# Patient Record
Sex: FEMALE | Race: WHITE
[De-identification: names, ages, dates, MRNs, and addresses within clinical notes are randomized per-mention and may not be internally consistent; named-entity substitution may affect disease eponyms.]

---

## 2017-04-18 PROBLEM — Z00.00 ENCOUNTER FOR PREVENTIVE HEALTH EXAMINATION: Status: ACTIVE | Noted: 2017-04-18

## 2017-05-22 ENCOUNTER — APPOINTMENT (OUTPATIENT)
Dept: HEART AND VASCULAR | Facility: CLINIC | Age: 27
End: 2017-05-22

## 2019-11-20 ENCOUNTER — APPOINTMENT (OUTPATIENT)
Dept: CARDIOLOGY | Facility: CLINIC | Age: 29
End: 2019-11-20

## 2020-02-29 ENCOUNTER — EMERGENCY (EMERGENCY)
Facility: HOSPITAL | Age: 30
LOS: 0 days | Discharge: HOME | End: 2020-02-29
Attending: EMERGENCY MEDICINE | Admitting: EMERGENCY MEDICINE
Payer: COMMERCIAL

## 2020-02-29 VITALS — OXYGEN SATURATION: 98 % | HEART RATE: 82 BPM | RESPIRATION RATE: 20 BRPM

## 2020-02-29 VITALS
RESPIRATION RATE: 20 BRPM | DIASTOLIC BLOOD PRESSURE: 84 MMHG | WEIGHT: 119.93 LBS | OXYGEN SATURATION: 96 % | TEMPERATURE: 99 F | HEART RATE: 102 BPM | SYSTOLIC BLOOD PRESSURE: 140 MMHG

## 2020-02-29 DIAGNOSIS — J45.909 UNSPECIFIED ASTHMA, UNCOMPLICATED: ICD-10-CM

## 2020-02-29 DIAGNOSIS — J45.901 UNSPECIFIED ASTHMA WITH (ACUTE) EXACERBATION: ICD-10-CM

## 2020-02-29 DIAGNOSIS — R07.89 OTHER CHEST PAIN: ICD-10-CM

## 2020-02-29 PROCEDURE — 99285 EMERGENCY DEPT VISIT HI MDM: CPT

## 2020-02-29 PROCEDURE — 71046 X-RAY EXAM CHEST 2 VIEWS: CPT | Mod: 26

## 2020-02-29 RX ORDER — DIPHENHYDRAMINE HCL 50 MG
25 CAPSULE ORAL ONCE
Refills: 0 | Status: COMPLETED | OUTPATIENT
Start: 2020-02-29 | End: 2020-02-29

## 2020-02-29 RX ORDER — DEXAMETHASONE 0.5 MG/5ML
10 ELIXIR ORAL ONCE
Refills: 0 | Status: COMPLETED | OUTPATIENT
Start: 2020-02-29 | End: 2020-02-29

## 2020-02-29 RX ORDER — IPRATROPIUM/ALBUTEROL SULFATE 18-103MCG
3 AEROSOL WITH ADAPTER (GRAM) INHALATION ONCE
Refills: 0 | Status: COMPLETED | OUTPATIENT
Start: 2020-02-29 | End: 2020-02-29

## 2020-02-29 RX ORDER — DEXAMETHASONE 0.5 MG/5ML
10 ELIXIR ORAL ONCE
Refills: 0 | Status: DISCONTINUED | OUTPATIENT
Start: 2020-02-29 | End: 2020-02-29

## 2020-02-29 RX ADMIN — Medication 3 MILLILITER(S): at 04:27

## 2020-02-29 RX ADMIN — Medication 25 MILLIGRAM(S): at 05:30

## 2020-02-29 RX ADMIN — Medication 3 MILLILITER(S): at 04:03

## 2020-02-29 RX ADMIN — Medication 100 MILLIGRAM(S): at 04:27

## 2020-02-29 RX ADMIN — Medication 10 MILLIGRAM(S): at 05:26

## 2020-02-29 NOTE — ED ADULT TRIAGE NOTE - CHIEF COMPLAINT QUOTE
pt with asthma exacerbation at home  used rescue inhaler, albuterol nebs & started prednisone at home with no relief

## 2020-02-29 NOTE — ED PROVIDER NOTE - OBJECTIVE STATEMENT
29y F pmh asthma presenting with cough and SOB x5 days. Nonproductive cough with SOB not improving with nebulizing treatments. Has been taking prednisone 50mg x4days. No f/c/n/v. Has never been intubated. Never has had ICU admission. This is how asthma exacerbation usually presents. No f/c/n/v. No leg swelling. No recent travel. No sick contacts. 29y F pmh asthma presenting with cough x5 days. Nonproductive cough not improving with nebulizing treatments. Has been taking prednisone 50mg x4days. No f/c/n/v. Patient denying SOB and chest pain but states she gets these coughing fits biannually typical of her asthma exacerbations. Has never been intubated. Never has had ICU admission. No f/c/n/v. No leg swelling. No recent travel. No sick contacts.

## 2020-02-29 NOTE — ED ADULT NURSE NOTE - OBJECTIVE STATEMENT
Pt reports asthma exacerbation. SOB and dry hacking cough not responding to home rescue inhalers, or prednisone. Increasing cough throughout the night. States that normally, when the weather goes from warm to cold is when her asthma is triggered. On prednisone at home for similar symptoms, but no relief for today.

## 2020-02-29 NOTE — ED PROVIDER NOTE - NSFOLLOWUPINSTRUCTIONS_ED_ALL_ED_FT
PLEASE FOLLOW UP WITH YOUR PULMONOLOGIST IN 1-3 DAYS.     Asthma    Asthma is a condition in which the airways tighten and narrow, making it difficult to breath. Asthma episodes, also called asthma attacks, range from minor to life-threatening. Symptoms include wheezing, coughing, chest tightness, or shortness of breath. The diagnosis of asthma is made by a review of your medical history and a physical exam, but may involve additional testing. Asthma cannot be cured, but medicines and lifestyle changes can help control it. Avoid triggers of asthma which may include animal dander, pollen, mold, smoke, air pollutants, etc.   Please take albuterol via pump or inhalation device every 4-6 hours for the next two days and then follow up with your pediatrician.  please remember to take your controller meds (pulmicort/flovent/advair) if you were prescribed them  SEEK IMMEDIATE MEDICAL CARE IF YOU HAVE ANY OF THE FOLLOWING SYMPTOMS: worsening of symptoms, shortness of breath at rest, chest pain, bluish discoloration to lips or fingertips, lightheadedness/dizziness, or fever.    Cough    Coughing is a reflex that clears your throat and your airways. Coughing helps to heal and protect your lungs. It is normal to cough occasionally, but a cough that happens with other symptoms or lasts a long time may be a sign of a condition that needs treatment. Coughing may be caused by infections, asthma or COPD, smoking, postnasal drip, gastroesophageal reflux, as well as other medical conditions. Take medicines only as instructed by your health care provider. Avoid environments or triggers that causes you to cough at work or at home.    SEEK IMMEDIATE MEDICAL CARE IF YOU HAVE ANY OF THE FOLLOWING SYMPTOMS: coughing up blood, shortness of breath, rapid heart rate, chest pain, unexplained weight loss or night sweats.

## 2020-02-29 NOTE — ED PROVIDER NOTE - PATIENT PORTAL LINK FT
You can access the FollowMyHealth Patient Portal offered by [facility] by registering at the following website: [email or web address]. By joining [employer]’s FollowMyHealth portal, you will also be able to view your health information using other applications (apps) compatible with our system.

## 2020-02-29 NOTE — ED ADULT NURSE NOTE - NSIMPLEMENTINTERV_GEN_ALL_ED
Implemented All Universal Safety Interventions:  [address] to call system. Call bell, personal items and telephone within reach. Instruct patient to call for assistance. Room bathroom lighting operational. Non-slip footwear when patient is off stretcher. Physically safe environment: no spills, clutter or unnecessary equipment. Stretcher in lowest position, wheels locked, appropriate side rails in place.

## 2020-02-29 NOTE — ED PROVIDER NOTE - NS ED ROS FT
Eyes:  No visual changes, eye pain or discharge.  ENMT:  No hearing changes, pain, no sore throat or runny nose, no difficulty swallowing  Cardiac:  No chest pain,  edema. No chest pain with exertion.  Respiratory:  +cough +hx of asthma +SOB  GI:  No nausea, vomiting, diarrhea or abdominal pain.  :  No dysuria, frequency or burning.  MS:  No myalgia, muscle weakness, joint pain or back pain.  Neuro:  No headache or weakness.  No LOC.  Skin:  No skin rash.   Endocrine: No history of thyroid disease or diabetes. Eyes:  No visual changes, eye pain or discharge.  ENMT:  No hearing changes, pain, no sore throat or runny nose, no difficulty swallowing  Cardiac:  No chest pain,  edema. No chest pain with exertion.  Respiratory:  +cough +hx of asthma   GI:  No nausea, vomiting, diarrhea or abdominal pain.  :  No dysuria, frequency or burning.  MS:  No myalgia, muscle weakness, joint pain or back pain.  Neuro:  No headache or weakness.  No LOC.  Skin:  No skin rash.   Endocrine: No history of thyroid disease or diabetes.

## 2020-02-29 NOTE — ED PROVIDER NOTE - PHYSICAL EXAMINATION
CONSTITUTIONAL: Well-developed; well-nourished; in no acute distress.   SKIN: warm, dry  HEAD: Normocephalic; atraumatic.  EYES: no conjunctival injection. PERRL.   ENT: No nasal discharge; airway clear.  NECK: Supple; non tender.  CARD: S1, S2 normal; no murmurs, gallops, or rubs. Regular rate and rhythm.   RESP: No wheezes, rales or rhonchi.  ABD: soft ntnd  EXT: Normal ROM.  No clubbing, cyanosis or edema.   LYMPH: No acute cervical adenopathy.  NEURO: Alert, oriented, grossly unremarkable  PSYCH: Cooperative, appropriate. CONSTITUTIONAL: Well-developed; well-nourished; in no acute distress.   SKIN: warm, dry  HEAD: Normocephalic; atraumatic.  EYES: no conjunctival injection. PERRL.   ENT: No nasal discharge; airway clear. Oropharynx is clear.   NECK: Supple; non tender.  CARD: S1, S2 normal; no murmurs, gallops, or rubs. Regular rate and rhythm.   RESP: No wheezes, rales or rhonchi. normal work of breathing.   ABD: soft ntnd  EXT: Normal ROM.  No clubbing, cyanosis or edema.   NEURO: Alert, oriented, grossly unremarkable  PSYCH: Cooperative, appropriate.

## 2020-02-29 NOTE — ED ADULT NURSE NOTE - NS_SISCREENINGSR_GEN_ALL_ED
Start Trazodone for sleep: Take 30 minutes to 1 hour before bedtime.     Take one tablet (50 mg) nightly for 14 nights, then increase to two tablets (100 mg) nightly.   Negative

## 2020-02-29 NOTE — ED PROVIDER NOTE - ATTENDING CONTRIBUTION TO CARE
hx of astham for past 4 days worsening chest tightness coughing that is simlar to her asthma without hemoptysis, cough is episodic and spasmodic and can be triggered by minor incidents, no chest pain no pleuritic symptoms. exam shows no wheezing but spasmodic coughing. patient already on prednisone 50 mg. will give dose of decadron here. treat with nebs and obtain cxr.

## 2020-10-12 PROBLEM — J45.909 UNSPECIFIED ASTHMA, UNCOMPLICATED: Chronic | Status: ACTIVE | Noted: 2020-02-29

## 2021-04-14 ENCOUNTER — APPOINTMENT (OUTPATIENT)
Dept: BREAST CENTER | Facility: CLINIC | Age: 31
End: 2021-04-14
Payer: COMMERCIAL

## 2021-04-14 VITALS
DIASTOLIC BLOOD PRESSURE: 80 MMHG | BODY MASS INDEX: 22.45 KG/M2 | HEIGHT: 62 IN | SYSTOLIC BLOOD PRESSURE: 116 MMHG | WEIGHT: 122 LBS | TEMPERATURE: 97.8 F

## 2021-04-14 DIAGNOSIS — D24.2 BENIGN NEOPLASM OF LEFT BREAST: ICD-10-CM

## 2021-04-14 DIAGNOSIS — Z78.9 OTHER SPECIFIED HEALTH STATUS: ICD-10-CM

## 2021-04-14 DIAGNOSIS — Z87.09 PERSONAL HISTORY OF OTHER DISEASES OF THE RESPIRATORY SYSTEM: ICD-10-CM

## 2021-04-14 PROCEDURE — 99203 OFFICE O/P NEW LOW 30 MIN: CPT

## 2021-04-14 PROCEDURE — 99072 ADDL SUPL MATRL&STAF TM PHE: CPT

## 2021-04-15 NOTE — PHYSICAL EXAM
[Normocephalic] : normocephalic [Atraumatic] : atraumatic [EOMI] : extra ocular movement intact [No Supraclavicular Adenopathy] : no supraclavicular adenopathy [No Cervical Adenopathy] : no cervical adenopathy [Examined in the supine and seated position] : examined in the supine and seated position [Symmetrical] : symmetrical [No dominant masses] : no dominant masses in right breast  [No dominant masses] : no dominant masses left breast [No Nipple Retraction] : no left nipple retraction [No Nipple Discharge] : no left nipple discharge [No Axillary Lymphadenopathy] : no left axillary lymphadenopathy [Soft] : abdomen soft [Not Tender] : non-tender [No Edema] : no edema [No Rashes] : no rashes [No Ulceration] : no ulceration [de-identified] : b/l nondiscrete nodularities but no suspicious abnormalities were palpated within either breast

## 2021-04-15 NOTE — PAST MEDICAL HISTORY
[Menstruating] : The patient is menstruating [Menarche Age ____] : age at menarche was [unfilled] [Definite ___ (Date)] : the last menstrual period was [unfilled] [Normal Amount/Duration] : it was of a normal amount and duration [Regular Cycle Intervals] : have been regular [Total Preg ___] : G[unfilled] [History of Hormone Replacement Treatment] : has no history of hormone replacement treatment [FreeTextEntry5] : denies  [FreeTextEntry6] : denies [FreeTextEntry7] : yes x 12 years  [FreeTextEntry8] : denies

## 2021-04-15 NOTE — ASSESSMENT
[FreeTextEntry1] : Nyasia is a 30 F with a stable L breast fibroadenoma. \par \par On exam, she had b/l nondiscrete nodularities palpated throughout both breasts, but no suspicious abnormalities were palpated within either breast. \par \par Her most recent imaging was a L breast US on 10/16/2020 which revealed a stable benign fibroadenoma @8N2, measuring 0.7 cm, deemed BIRADS 2.  Her previously visualized L breast cluster of cysts @5N3 was no longer seen.  \par \par We discussed fibroadenomas.  These are benign lesions without any malignant potential.  They are hormonally influenced and can increase or decrease in size and can also regress spontaneously.  They are considered proliferative lesions without atypia.  Patients with these lesions have been found to have a slightly increased relative risk of breast cancer compared to the reference population.  Surgical excision is recommended when the diagnosis in unclear, the lesion is causing pain or breast deformity, or if it is rapidly enlarging. \par \par The reason that we recommend surgical excision for a rapidly enlarging fibroadenoma is because there is a possibility that this could be a Phyllodes Tumor.  The treatment of this lesion is wide local excision with 1 cm margins.  A sentinel lymph node would not be necessary as this disease very rarely spreads to the lymph nodes. \par \par She is currently asymptomatic from her left breast fibroadenoma, so no acute surgical intervention is indicated at this time. \par \par She is otherwise at an average risk for breast cancer and should start annual screening mammograms at the age of 40. \par \par She can follow up with me as needed. \par All of her questions were answered.  She knows to call with any further questions or concerns. \par \par PLAN: \par -f/up PRN

## 2021-04-15 NOTE — DATA REVIEWED
[FreeTextEntry1] : Exam requested by:\par DARVIN ZARAGOZA MD\par [address], [address]\par [facility] [address]\par SITE PERFORMED: [address]\par SITE PHONE: [phone]\par Patient: ZARIA MARSHALL\par YOB: 1990\par Phone: [phone]\par MRN: [id number] Acc: [id number]\par Date of Exam: 10-\par  \par EXAM: ULTRASOUND UNILATERAL LEFT BREAST COMPLETE\par \par HISTORY: The patient is 30 years old and is seen for follow-up There is no personal history of breast cancer. Family history of breast cancer: None.\par \par TECHNIQUE: A left breast ultrasound was performed with complete evaluation of the four quadrants, retroareolar region, and axilla.\par \par COMPARISON: Ultrasound-guided biopsy/pathology result 7/5/2019 and breast ultrasound 6/14/2019\par \par FINDINGS:\par BREAST ECHOTEXTURE: There is a heterogeneous background echotexture. \par \par No suspicious solid or complex cystic mass is detected in the breast. There is no pathological lymphadenopathy in the axilla. There is a stable 0.7 cm hypoechoic mass in the left breast 8 o'clock position 2 cm from the nipple at the site of prior biopsy consistent with known benign fibroadenoma. The previously visualized left breast 5 o'clock lesion is not seen consistent with resolved benign etiology. \par \par IMPRESSION: No ultrasonographic evidence of malignancy.\par \par FOLLOW-UP: Follow-up imaging at age 42 unless clinically indicated earlier.\par \par \par ASSESSMENT: BI-RADS Category 2:  Benign.\par \par This examination should not preclude the clinical evaluation of a suspicious palpable abnormality.\par \par Thank you for the opportunity to participate in the care of this patient.  \par  \par CARTER PACHECO MD  - Electronically Signed: 10- 9:23 AM \par Physician to Physician Direct Line is: [phone]

## 2023-06-09 ENCOUNTER — EMERGENCY (EMERGENCY)
Facility: HOSPITAL | Age: 33
LOS: 0 days | Discharge: HOME | End: 2023-06-09
Attending: EMERGENCY MEDICINE
Payer: COMMERCIAL

## 2023-06-09 VITALS — RESPIRATION RATE: 18 BRPM | OXYGEN SATURATION: 100 % | HEART RATE: 81 BPM

## 2023-06-09 VITALS
WEIGHT: 125 LBS | TEMPERATURE: 99 F | SYSTOLIC BLOOD PRESSURE: 163 MMHG | OXYGEN SATURATION: 99 % | HEART RATE: 98 BPM | DIASTOLIC BLOOD PRESSURE: 69 MMHG | RESPIRATION RATE: 17 BRPM

## 2023-06-09 DIAGNOSIS — J45.901 UNSPECIFIED ASTHMA WITH (ACUTE) EXACERBATION: ICD-10-CM

## 2023-06-09 DIAGNOSIS — Z91.013 ALLERGY TO SEAFOOD: ICD-10-CM

## 2023-06-09 DIAGNOSIS — R05.1 ACUTE COUGH: ICD-10-CM

## 2023-06-09 DIAGNOSIS — R06.2 WHEEZING: ICD-10-CM

## 2023-06-09 DIAGNOSIS — R07.89 OTHER CHEST PAIN: ICD-10-CM

## 2023-06-09 LAB
ANION GAP SERPL CALC-SCNC: 12 MMOL/L — SIGNIFICANT CHANGE UP (ref 7–14)
BASE EXCESS BLDV CALC-SCNC: 2.1 MMOL/L — SIGNIFICANT CHANGE UP (ref -2–3)
BASOPHILS # BLD AUTO: 0.06 K/UL — SIGNIFICANT CHANGE UP (ref 0–0.2)
BASOPHILS NFR BLD AUTO: 0.4 % — SIGNIFICANT CHANGE UP (ref 0–1)
BUN SERPL-MCNC: 8 MG/DL — LOW (ref 10–20)
CA-I SERPL-SCNC: 1.17 MMOL/L — SIGNIFICANT CHANGE UP (ref 1.15–1.33)
CALCIUM SERPL-MCNC: 8.8 MG/DL — SIGNIFICANT CHANGE UP (ref 8.4–10.4)
CHLORIDE SERPL-SCNC: 104 MMOL/L — SIGNIFICANT CHANGE UP (ref 98–110)
CO2 SERPL-SCNC: 23 MMOL/L — SIGNIFICANT CHANGE UP (ref 17–32)
CREAT SERPL-MCNC: 0.6 MG/DL — LOW (ref 0.7–1.5)
EGFR: 124 ML/MIN/1.73M2 — SIGNIFICANT CHANGE UP
EOSINOPHIL # BLD AUTO: 0.13 K/UL — SIGNIFICANT CHANGE UP (ref 0–0.7)
EOSINOPHIL NFR BLD AUTO: 1 % — SIGNIFICANT CHANGE UP (ref 0–8)
GAS PNL BLDV: 136 MMOL/L — SIGNIFICANT CHANGE UP (ref 136–145)
GAS PNL BLDV: SIGNIFICANT CHANGE UP
GLUCOSE SERPL-MCNC: 100 MG/DL — HIGH (ref 70–99)
HCG SERPL QL: NEGATIVE — SIGNIFICANT CHANGE UP
HCO3 BLDV-SCNC: 28 MMOL/L — SIGNIFICANT CHANGE UP (ref 22–29)
HCT VFR BLD CALC: 37.3 % — SIGNIFICANT CHANGE UP (ref 37–47)
HCT VFR BLDA CALC: 36 % — LOW (ref 39–51)
HGB BLD CALC-MCNC: 12 G/DL — LOW (ref 12.6–17.4)
HGB BLD-MCNC: 11.9 G/DL — LOW (ref 12–16)
IMM GRANULOCYTES NFR BLD AUTO: 0.4 % — HIGH (ref 0.1–0.3)
LACTATE BLDV-MCNC: 1.1 MMOL/L — SIGNIFICANT CHANGE UP (ref 0.5–2)
LYMPHOCYTES # BLD AUTO: 38 % — SIGNIFICANT CHANGE UP (ref 20.5–51.1)
LYMPHOCYTES # BLD AUTO: 5.14 K/UL — HIGH (ref 1.2–3.4)
MCHC RBC-ENTMCNC: 31.2 PG — HIGH (ref 27–31)
MCHC RBC-ENTMCNC: 31.9 G/DL — LOW (ref 32–37)
MCV RBC AUTO: 97.9 FL — SIGNIFICANT CHANGE UP (ref 81–99)
MONOCYTES # BLD AUTO: 0.77 K/UL — HIGH (ref 0.1–0.6)
MONOCYTES NFR BLD AUTO: 5.7 % — SIGNIFICANT CHANGE UP (ref 1.7–9.3)
NEUTROPHILS # BLD AUTO: 7.39 K/UL — HIGH (ref 1.4–6.5)
NEUTROPHILS NFR BLD AUTO: 54.5 % — SIGNIFICANT CHANGE UP (ref 42.2–75.2)
NRBC # BLD: 0 /100 WBCS — SIGNIFICANT CHANGE UP (ref 0–0)
PCO2 BLDV: 47 MMHG — HIGH (ref 39–42)
PH BLDV: 7.38 — SIGNIFICANT CHANGE UP (ref 7.32–7.43)
PLATELET # BLD AUTO: 285 K/UL — SIGNIFICANT CHANGE UP (ref 130–400)
PMV BLD: 9.9 FL — SIGNIFICANT CHANGE UP (ref 7.4–10.4)
PO2 BLDV: 20 MMHG — SIGNIFICANT CHANGE UP
POTASSIUM BLDV-SCNC: 3.6 MMOL/L — SIGNIFICANT CHANGE UP (ref 3.5–5.1)
POTASSIUM SERPL-MCNC: 3.9 MMOL/L — SIGNIFICANT CHANGE UP (ref 3.5–5)
POTASSIUM SERPL-SCNC: 3.9 MMOL/L — SIGNIFICANT CHANGE UP (ref 3.5–5)
RBC # BLD: 3.81 M/UL — LOW (ref 4.2–5.4)
RBC # FLD: 13 % — SIGNIFICANT CHANGE UP (ref 11.5–14.5)
SAO2 % BLDV: 20.6 % — SIGNIFICANT CHANGE UP
SODIUM SERPL-SCNC: 139 MMOL/L — SIGNIFICANT CHANGE UP (ref 135–146)
WBC # BLD: 13.54 K/UL — HIGH (ref 4.8–10.8)
WBC # FLD AUTO: 13.54 K/UL — HIGH (ref 4.8–10.8)

## 2023-06-09 PROCEDURE — 85018 HEMOGLOBIN: CPT

## 2023-06-09 PROCEDURE — 71045 X-RAY EXAM CHEST 1 VIEW: CPT

## 2023-06-09 PROCEDURE — 71045 X-RAY EXAM CHEST 1 VIEW: CPT | Mod: 26

## 2023-06-09 PROCEDURE — 82330 ASSAY OF CALCIUM: CPT

## 2023-06-09 PROCEDURE — 99285 EMERGENCY DEPT VISIT HI MDM: CPT

## 2023-06-09 PROCEDURE — 99285 EMERGENCY DEPT VISIT HI MDM: CPT | Mod: 25

## 2023-06-09 PROCEDURE — 94640 AIRWAY INHALATION TREATMENT: CPT

## 2023-06-09 PROCEDURE — 83605 ASSAY OF LACTIC ACID: CPT

## 2023-06-09 PROCEDURE — 84703 CHORIONIC GONADOTROPIN ASSAY: CPT

## 2023-06-09 PROCEDURE — 84132 ASSAY OF SERUM POTASSIUM: CPT

## 2023-06-09 PROCEDURE — 36415 COLL VENOUS BLD VENIPUNCTURE: CPT

## 2023-06-09 PROCEDURE — 85025 COMPLETE CBC W/AUTO DIFF WBC: CPT

## 2023-06-09 PROCEDURE — 96374 THER/PROPH/DIAG INJ IV PUSH: CPT

## 2023-06-09 PROCEDURE — 85014 HEMATOCRIT: CPT

## 2023-06-09 PROCEDURE — 82803 BLOOD GASES ANY COMBINATION: CPT

## 2023-06-09 PROCEDURE — 80048 BASIC METABOLIC PNL TOTAL CA: CPT

## 2023-06-09 PROCEDURE — 96375 TX/PRO/DX INJ NEW DRUG ADDON: CPT

## 2023-06-09 PROCEDURE — 84295 ASSAY OF SERUM SODIUM: CPT

## 2023-06-09 RX ORDER — IPRATROPIUM/ALBUTEROL SULFATE 18-103MCG
3 AEROSOL WITH ADAPTER (GRAM) INHALATION
Refills: 0 | Status: COMPLETED | OUTPATIENT
Start: 2023-06-09 | End: 2023-06-09

## 2023-06-09 RX ORDER — SODIUM CHLORIDE 9 MG/ML
1000 INJECTION, SOLUTION INTRAVENOUS ONCE
Refills: 0 | Status: COMPLETED | OUTPATIENT
Start: 2023-06-09 | End: 2023-06-09

## 2023-06-09 RX ORDER — IPRATROPIUM/ALBUTEROL SULFATE 18-103MCG
3 AEROSOL WITH ADAPTER (GRAM) INHALATION ONCE
Refills: 0 | Status: COMPLETED | OUTPATIENT
Start: 2023-06-09 | End: 2023-06-09

## 2023-06-09 RX ORDER — MAGNESIUM SULFATE 500 MG/ML
2 VIAL (ML) INJECTION ONCE
Refills: 0 | Status: COMPLETED | OUTPATIENT
Start: 2023-06-09 | End: 2023-06-09

## 2023-06-09 RX ADMIN — Medication 3 MILLILITER(S): at 05:05

## 2023-06-09 RX ADMIN — Medication 3 MILLILITER(S): at 06:42

## 2023-06-09 RX ADMIN — SODIUM CHLORIDE 1000 MILLILITER(S): 9 INJECTION, SOLUTION INTRAVENOUS at 05:04

## 2023-06-09 RX ADMIN — Medication 3 MILLILITER(S): at 06:40

## 2023-06-09 RX ADMIN — Medication 150 GRAM(S): at 05:04

## 2023-06-09 RX ADMIN — Medication 125 MILLIGRAM(S): at 05:04

## 2023-06-09 RX ADMIN — Medication 3 MILLILITER(S): at 07:30

## 2023-06-09 NOTE — ED PROVIDER NOTE - CARE PROVIDER_API CALL
Your pulmonologist,   Phone: (   )    -  Fax: (   )    -  Established Patient  Follow Up Time: 1-3 Days

## 2023-06-09 NOTE — ED ADULT TRIAGE NOTE - CHIEF COMPLAINT QUOTE
h.o asthma, has been coughing since sunday  taking nebs with steroids with no relief & chest tightness increasing

## 2023-06-09 NOTE — ED PROVIDER NOTE - CLINICAL SUMMARY MEDICAL DECISION MAKING FREE TEXT BOX
Patient signed out to me re-eval and re-asses - 32-year-old female with history of asthma presented with cough 5 days,  wheezing shortness of breath chest tightness.  Patient has prednisone at home and has been taking 50 mg once a day for 5 days.  Patient on this evaluation was wheezing decreased air movement bilaterally.  Patient now reports feeling improved with clear lungs auscultation bilaterally no dyspnea on exertion mild hoarse sounding voice.  Patient advised take  2 more days of steroids outpatient pulmonary follow-up stable for discharge.  Reeval reassess.

## 2023-06-09 NOTE — ED PROVIDER NOTE - PROGRESS NOTE DETAILS
[facility]: Endorsed to Dr. De Leon. Reassess and dispo accordingly. TD: Patient reassessed, states that her breathing is significantly improved. Patient's lungs now minimal wheezing, speaking in full sentences. Shared decision making utilized to try 1 more DuoNeb and reassess. Patient endorsed to Dr. Box to reassess patient and dispo. TD: Patient reassessed, states that her breathing is significantly improved. Patient's lungs now minimal wheezing, speaking in full sentences. Discussed admission versus discharge with strict return precautions with pt. Pt states she is comfortable going home, has 15x tablets of 50mg prednisone at home and will take them daily, will f/u immediately with her pulmonologist. Pt agreeable with return precautions, ready for d/c.

## 2023-06-09 NOTE — ED ADULT NURSE NOTE - NSSEPSISSUSPECTED_ED_A_ED
[facility]- Pediatric Dermatology  Dr. Albina William, Dr. Amrit Hernandez, Dr. Kassi Osborn, Dr. Alexia Hughes & Dr. Dallin Swan       Non Urgent  Nurse Triage Line; [phone]- Heide and Monik RN Care Coordinators      [facility] Pediatric Dermatology Specialty - [phone]      If you need a prescription refill, please contact your pharmacy. Refills are approved or denied by our Physicians during normal business hours, Monday through Fridays    Per office policy, refills will not be granted if you have not been seen within the past year (or sooner depending on your child's condition)      Scheduling Information:     Pediatric Appointment Scheduling and Call Center [phone]   Radiology Scheduling- [phone]     Sedation Unit Scheduling- [phone]    [place] Scheduling- General [phone]; Pediatric Dermatology [phone]    Main  Services: [phone]   French: [phone]   Botswanan: [phone]   Hmong/Ozzie/Arabic: [phone]      Preadmission Nursing Department Fax Number: [phone] (Fax all pre-operative paperwork to this number)      For urgent matters arising during evenings, weekends, or holidays that cannot wait for normal business hours please call [phone] and ask for the Dermatology Resident On-Call to be paged.         Codes to call insurance with to inquire about treatment:  UVB Light Treatments- CPT 06302  ICD 10 Diagnosis code - Vitiligo L80.0          For the groin, please apply ketoconazole cream 2% three times a day for two weeks. If your rash does not improve, switch to hydrocortisone 2.5% cream up to twice daily as needed.     Please perform gentle skin cares for the skin to reduce history of itchy skin. (see gentle skin care hand out below).    For vitiligo, we recommend looking into dermatology offices nearby for phototherapy treatments twice to three times weekly. If this works well, we can try and  get a home unit approved.     [place] [id number], [facility] [address]  [place] [address]  [phone]    Gentle Skin Care    Below is a list of products our providers recommend for gentle skin care.  Moisturizers:    Lighter; Exederm Intensive Moisture Cream, Cetaphil Cream, CeraVe, Aveeno Positively radiant and Vanicream Light     Thicker; Aquaphor Ointment, Vaseline, Petroleum Jelly, Eucerin Original Healing Cream and Vanicream, CeraVe Healing Ointment, Aquaphor Body Spray    Avoid Lotions (too thin)  Mild Cleansers:    Dove- Fragrance Free bar or wash    CeraVe     Vanicream Cleansing bar    Cetaphil Cleanser     Aquaphor 2 in1 Gentle Wash and Shampoo    Dove Baby wash    Exederm Body wash       Laundry Products:      All Free and Clear    Cheer Free    Generic Brands are okay as long as they are  Fragrance Free      Avoid fabric softeners  and dryer sheets   Sunscreens: SPF 30 or greater       Sunscreens that contain Zinc Oxide and/or Titanium Dioxide should be applied, these are physical blockers. One or both of these should be listed in the  Active Ingredients     Any other listed ingredients under the active ingredients would be a chemically based sunscreen which might be irritating.    Spray sunscreens should be avoided because these are typically chemical sunscreens.      Shampoo and Conditioners:    Free and Clear by Vanicream    Aquaphor 2 in 1 Gentle Wash and Shampoo   Oils:    Mineral Oil     Emu Oil     For some patients: Coconut (raw, unrefined, organic) and Sunflower seed oil              Generic Products are an okay substitute, but make sure they are fragrance free.  *Reading the product ingredients list is very important  *Avoid product that have fragrance added to them.   *Organic does not mean  fragrance free.  In fact patients with sensitive skin can become quite irritated by some organic products.     1. Daily bathing is recommended. Make sure you are applying a good moisturizer  after bathing every time.  2. Use Moisturizing creams at least twice daily to the whole body. Your provider may recommend a lighter or heavier moisturizer based on your child s severity and that time of year it is.  3. Creams are more moisturizing than lotions.       Care Plan:  1. Keep bathing and showering short, less than 15 minutes   2. Always use lukewarm warm when possible. AVOID HOT or COLD water  3. DO NOT use bubble bath  4. Limit the use of soaps. Focus on the skin folds, face, armpits, groin and feet towards the end of the bath  5. Do NOT vigorously scrub when you cleanse the skin  6. After bathing, PAT your skin lightly with a towel. DO NOT rub or scrub when drying  7. ALWAYS apply a moisturizer immediately after bathing. This helps to  lock in  the moisture. * IF YOU WERE PRESCRIBED A TOPICAL MEDICATION, APPLY YOUR MEDICATION FIRST THEN COVER WITH YOUR DAILY MOISTURIZER  8. Reapply moisturizing agents at least twice daily to your whole body    Other helpful tips:    Do not use products such as powders, perfumes, or colognes on your skin    Diffusers can be harsh on sensitive skin, use with caution if you or your child has sensitive skin     Avoid saunas and steam baths. This temperature is too HOT    Avoid tight or  scratchy  clothing such as wool    Always wash new clothing before wearing them for the first time    Sometimes a humidifier or vaporizer can be used at night can help the dry skin. Remember to keep these items clean to avoid mold growth.      Pediatric Dermatology  [address] [address]  [phone]    You have been referred to the Adult Dermatology clinic for your phototherapy. Please contact your insurance prior to setting up your appointment so you are away of any costs for this treatment. The common codes required by your insurance are below.   After you have contacted your insurance company you may call the phone number listed below  to set up your appointment. Instructions for phototherapy are also listed below for your reference.     PHOTOTHERAPY  You have been referred for Narrowband UVB or Excimer Laser treatment. Our Phototherapy Center is located in our Adult Dermatology Clinic:    [address] [place] [address] [address]  [place] [address]  Appointments: [phone]  Provider Referrals: [phone]    General Information  1. YOU MUST CHECK WITH YOUR INSURANCE COMPANY TO SURE PHOTOTHERAY IS COVERED BEFORE YOU MAY START TREATMENT. (They will ask you the day you are scheduled for treatment if you have contacted your insurance)  2. Procedure codes you will need to tell your insurance company are:  a. UVB Light Treatments- 11130  b. PUVA- 04783  c. Xcimer Laser Treatment- 82438  3. You must call [phone] to set up your appointments for your treatments. Appointment times are available Monday through Friday 7:00 am to 3:30 pm. You may set up multiple appointments at a time once you know the frequency of your treatments.      Day of Treatment:  1. DO NOT APPLY ANY MEDICATIONS PRIOR TO TREATMENT. You may apply your topical medications AFTER your treatment as prescribed by your doctor.   2. Please provide the nurse with your full name and date of birth each time you come for treatments  3. Consistency in your treatments will produce the best results.  4. Report any redness or burning that lasts longer than 24 hours, any increase in itching or changes in the skin condition. You should feel like your skin has a  sun kissed  feeling.  5. If is important that your eyes be protected by goggles. Direct exposure of the eyes to UVB will cause a painful irritation of delicate tissues. Also, close your eyes with the goggles on.  6. Anytime you start or stop any medications, you MUST notify the nurse. Both prescription and non-prescription medications may make you more sensitive to ultraviolet light and may cause a painful sunburn and/or rash.  7. You will need  to avoid additional sunlight on the days you receive light treatment. Use sun block as needed.    Treatments:  1. Your exposure to light therapy will be gradually increased with each treatment. Each treatment may produce a slight reddening of the skin. This may appear between 8-24 hours after exposure.   2. If you have orders to cover parts of your body during the treatment; this may be done using an undergarment, shirt, etc. Always use the same item every time to prevent burning.  3. Try to avoid altering your position in the etienne during the treatment, unless directed otherwise. Unwanted movement may cause painful burns to previously untreated areas.      Contact Phone Numbers:  Pediatric Dermatology Triage Line- [phone]  Adult Dermatology (light treatment scheduling)- [phone]  Direct Line to Phototherapy (call with reactions)- [phone]   Services- [phone]   Services- [phone]   No

## 2023-06-09 NOTE — ED PROVIDER NOTE - PATIENT PORTAL LINK FT
You can access the FollowMyHealth Patient Portal offered by [facility] by registering at the following website: [email or web address]. By joining [employer]’s FollowMyHealth portal, you will also be able to view your health information using other applications (apps) compatible with our system.

## 2023-06-09 NOTE — ED PROVIDER NOTE - PROVIDER TOKENS
FREE:[LAST:[Your pulmonologist],PHONE:[(   )    -],FAX:[(   )    -],FOLLOWUP:[1-3 Days],ESTABLISHEDPATIENT:[T]]

## 2023-06-09 NOTE — ED ADULT NURSE NOTE - NSFALLUNIVINTERV_ED_ALL_ED
Bed/Stretcher in lowest position, wheels locked, appropriate side rails in place/Call bell, personal items and telephone in reach/Instruct patient to call for assistance before getting out of bed/chair/stretcher/Non-slip footwear applied when patient is off stretcher/[place] to call system/Physically safe environment - no spills, clutter or unnecessary equipment/Purposeful proactive rounding/Room/bathroom lighting operational, light cord in reach

## 2023-06-09 NOTE — ED PROVIDER NOTE - OBJECTIVE STATEMENT
Pt is a 32F with a PMHx of moderate severity asthma, never hospitalized, p/w cough x5 days with associated wheezing, chest tightness and shortness of breath.  Patient reports taking 50mg of prednisone daily for past five days and albuterol nebulizer without improvement of symptoms.  Denies any fever, sore throat, ear pain or fullness, abdominal pain, or nausea/vomiting/diarrhea.

## 2023-06-09 NOTE — ED PROVIDER NOTE - PHYSICAL EXAMINATION
CONSTITUTIONAL: + mild respiratory distress, otherwise nontoxic-appearing, NAD;   SKIN:  warm, dry; no rashes  HEAD:  NCAT;   EYES:  NL inspection;   ENT: posterior oropharynx clear, uvula midline, MMM;   NECK: supple; normal ROM;   CARD:  RRR; radial pulses 2+ X b/l UE  RESP: + frequent coarse cough heard, + decreased air movement bilateral lung fields, + moderate severity wheezing b/l, no crackles,  ABD:  S/NT, no R/G;   MSK:  no extremity injury/deformity;   NEURO:  grossly unremarkable;   PSYCH:  cooperative, appropriate;

## 2023-06-09 NOTE — ED PROVIDER NOTE - ATTENDING CONTRIBUTION TO CARE
I personally evaluated the patient. I reviewed the Resident’s or Physician Assistant’s note (as assigned above), and agree with the findings and plan except as documented in my note.  32-year-old female past medical history of asthma presents with complaints of coughing, associated wheezing, chest tightness and shortness of breath for the past 5 days.  Patient reports taking intermittent months of prednisone daily and albuterol nebulizer without improvement of symptoms.  Denies any fever.  VSS, non toxic appearing, NAD, Head NCAT, MMM, neck supple, normal ROM, normal s1s2, lungs ctab without any wheezing, patient is status post 3 DuoNebs and prednisolone, abd s/nt/nd, no guarding or rebound, extremities wnl, AAO x 3, GCS 15, neuro grossly normal. No acute skin lesions. Plan is labs, imaging, meds and reassess.

## 2023-06-09 NOTE — ED ADULT NURSE NOTE - OBJECTIVE STATEMENT
31 y/o female presented to ED c/o nonproductive cough and chest tightness since sunday pt with hx asthma.
